# Patient Record
Sex: MALE | Race: WHITE | Employment: FULL TIME | URBAN - METROPOLITAN AREA
[De-identification: names, ages, dates, MRNs, and addresses within clinical notes are randomized per-mention and may not be internally consistent; named-entity substitution may affect disease eponyms.]

---

## 2020-10-12 NOTE — PROGRESS NOTES
PT aware of NPO status. PT aware of need to hold anticoagulants per protocol. PT aware of potential for sedation administration and need for  at discharge. PT aware of arrival time pre procedure. Arrive at 0900 on 10/13/20 for 1030 appt. Pt states no questions at this time.

## 2020-10-13 ENCOUNTER — HOSPITAL ENCOUNTER (OUTPATIENT)
Dept: INTERVENTIONAL RADIOLOGY/VASCULAR | Age: 40
Discharge: HOME | End: 2020-10-13
Attending: SURGERY | Admitting: SURGERY
Payer: COMMERCIAL

## 2020-10-13 VITALS
SYSTOLIC BLOOD PRESSURE: 115 MMHG | BODY MASS INDEX: 35.59 KG/M2 | TEMPERATURE: 98.4 F | OXYGEN SATURATION: 97 % | DIASTOLIC BLOOD PRESSURE: 72 MMHG | WEIGHT: 262.8 LBS | RESPIRATION RATE: 16 BRPM | HEART RATE: 80 BPM | HEIGHT: 72 IN

## 2020-10-13 DIAGNOSIS — G89.29 CHRONIC ABDOMINAL PAIN: ICD-10-CM

## 2020-10-13 DIAGNOSIS — R10.9 CHRONIC ABDOMINAL PAIN: ICD-10-CM

## 2020-10-13 LAB
APTT PPP: 27.9 SEC (ref 23–36.4)
INR PPP: 1.1 (ref 0.8–1.2)
PROTHROMBIN TIME: 14 SEC (ref 11.5–15.2)

## 2020-10-13 PROCEDURE — 77030016699 HC CATH ANGI DX INFN1 CARD -A

## 2020-10-13 PROCEDURE — 99152 MOD SED SAME PHYS/QHP 5/>YRS: CPT

## 2020-10-13 PROCEDURE — 36415 COLL VENOUS BLD VENIPUNCTURE: CPT

## 2020-10-13 PROCEDURE — 74011250637 HC RX REV CODE- 250/637: Performed by: SURGERY

## 2020-10-13 PROCEDURE — 74011000250 HC RX REV CODE- 250: Performed by: SURGERY

## 2020-10-13 PROCEDURE — 99153 MOD SED SAME PHYS/QHP EA: CPT

## 2020-10-13 PROCEDURE — 85610 PROTHROMBIN TIME: CPT

## 2020-10-13 PROCEDURE — 74011000636 HC RX REV CODE- 636: Performed by: SURGERY

## 2020-10-13 PROCEDURE — 85730 THROMBOPLASTIN TIME PARTIAL: CPT

## 2020-10-13 PROCEDURE — 75726 ARTERY X-RAYS ABDOMEN: CPT

## 2020-10-13 PROCEDURE — 74011250636 HC RX REV CODE- 250/636: Performed by: SURGERY

## 2020-10-13 RX ORDER — VERAPAMIL HYDROCHLORIDE 2.5 MG/ML
INJECTION, SOLUTION INTRAVENOUS
Status: DISPENSED
Start: 2020-10-13 | End: 2020-10-13

## 2020-10-13 RX ORDER — FLUMAZENIL 0.1 MG/ML
0.2 INJECTION INTRAVENOUS
Status: DISCONTINUED | OUTPATIENT
Start: 2020-10-13 | End: 2020-10-13 | Stop reason: HOSPADM

## 2020-10-13 RX ORDER — HEPARIN SODIUM 200 [USP'U]/100ML
1000 INJECTION, SOLUTION INTRAVENOUS
Status: DISCONTINUED | OUTPATIENT
Start: 2020-10-13 | End: 2020-10-13 | Stop reason: HOSPADM

## 2020-10-13 RX ORDER — MIDAZOLAM HYDROCHLORIDE 1 MG/ML
.5-4 INJECTION, SOLUTION INTRAMUSCULAR; INTRAVENOUS
Status: DISCONTINUED | OUTPATIENT
Start: 2020-10-13 | End: 2020-10-13 | Stop reason: HOSPADM

## 2020-10-13 RX ORDER — VERAPAMIL HYDROCHLORIDE 2.5 MG/ML
2.5 INJECTION, SOLUTION INTRAVENOUS ONCE
Status: COMPLETED | OUTPATIENT
Start: 2020-10-13 | End: 2020-10-13

## 2020-10-13 RX ORDER — LIDOCAINE HYDROCHLORIDE 10 MG/ML
1-10 INJECTION, SOLUTION EPIDURAL; INFILTRATION; INTRACAUDAL; PERINEURAL
Status: DISCONTINUED | OUTPATIENT
Start: 2020-10-13 | End: 2020-10-13 | Stop reason: HOSPADM

## 2020-10-13 RX ORDER — NITROGLYCERIN 10 MG/100ML
5-200 INJECTION INTRAVENOUS
Status: DISCONTINUED | OUTPATIENT
Start: 2020-10-13 | End: 2020-10-13 | Stop reason: HOSPADM

## 2020-10-13 RX ORDER — ONDANSETRON 2 MG/ML
4 INJECTION INTRAMUSCULAR; INTRAVENOUS
Status: DISCONTINUED | OUTPATIENT
Start: 2020-10-13 | End: 2020-10-13 | Stop reason: HOSPADM

## 2020-10-13 RX ORDER — NALOXONE HYDROCHLORIDE 0.4 MG/ML
0.4 INJECTION, SOLUTION INTRAMUSCULAR; INTRAVENOUS; SUBCUTANEOUS AS NEEDED
Status: DISCONTINUED | OUTPATIENT
Start: 2020-10-13 | End: 2020-10-13 | Stop reason: HOSPADM

## 2020-10-13 RX ORDER — SODIUM CHLORIDE 9 MG/ML
25 INJECTION, SOLUTION INTRAVENOUS CONTINUOUS
Status: DISCONTINUED | OUTPATIENT
Start: 2020-10-13 | End: 2020-10-13 | Stop reason: HOSPADM

## 2020-10-13 RX ORDER — NITROGLYCERIN 10 MG/100ML
INJECTION INTRAVENOUS
Status: DISPENSED
Start: 2020-10-13 | End: 2020-10-13

## 2020-10-13 RX ORDER — OXYCODONE AND ACETAMINOPHEN 5; 325 MG/1; MG/1
1-2 TABLET ORAL
Status: DISCONTINUED | OUTPATIENT
Start: 2020-10-13 | End: 2020-10-13 | Stop reason: HOSPADM

## 2020-10-13 RX ORDER — FENTANYL CITRATE 50 UG/ML
25-200 INJECTION, SOLUTION INTRAMUSCULAR; INTRAVENOUS
Status: DISCONTINUED | OUTPATIENT
Start: 2020-10-13 | End: 2020-10-13 | Stop reason: HOSPADM

## 2020-10-13 RX ORDER — HEPARIN SODIUM 1000 [USP'U]/ML
10000 INJECTION, SOLUTION INTRAVENOUS; SUBCUTANEOUS
Status: DISCONTINUED | OUTPATIENT
Start: 2020-10-13 | End: 2020-10-13 | Stop reason: HOSPADM

## 2020-10-13 RX ADMIN — FENTANYL CITRATE 50 MCG: 50 INJECTION, SOLUTION INTRAMUSCULAR; INTRAVENOUS at 11:22

## 2020-10-13 RX ADMIN — HEPARIN SODIUM IN SODIUM CHLORIDE 2000 UNITS: 200 INJECTION INTRAVENOUS at 12:08

## 2020-10-13 RX ADMIN — HEPARIN SODIUM 7000 UNITS: 1000 INJECTION INTRAVENOUS; SUBCUTANEOUS at 11:21

## 2020-10-13 RX ADMIN — LIDOCAINE HYDROCHLORIDE ANHYDROUS 5 ML: 10 INJECTION, SOLUTION INFILTRATION at 12:07

## 2020-10-13 RX ADMIN — MIDAZOLAM HYDROCHLORIDE 1 MG: 1 INJECTION, SOLUTION INTRAMUSCULAR; INTRAVENOUS at 11:25

## 2020-10-13 RX ADMIN — VERAPAMIL HYDROCHLORIDE 2.5 MG: 2.5 INJECTION INTRAVENOUS at 11:22

## 2020-10-13 RX ADMIN — FENTANYL CITRATE 50 MCG: 50 INJECTION, SOLUTION INTRAMUSCULAR; INTRAVENOUS at 11:25

## 2020-10-13 RX ADMIN — OXYCODONE HYDROCHLORIDE AND ACETAMINOPHEN 2 TABLET: 5; 325 TABLET ORAL at 15:09

## 2020-10-13 RX ADMIN — SODIUM CHLORIDE 25 ML/HR: 900 INJECTION, SOLUTION INTRAVENOUS at 10:15

## 2020-10-13 RX ADMIN — SODIUM CHLORIDE 125 ML/HR: 900 INJECTION, SOLUTION INTRAVENOUS at 11:39

## 2020-10-13 RX ADMIN — IOPAMIDOL 55 ML: 510 INJECTION, SOLUTION INTRAVASCULAR at 12:07

## 2020-10-13 RX ADMIN — MIDAZOLAM HYDROCHLORIDE 1 MG: 1 INJECTION, SOLUTION INTRAMUSCULAR; INTRAVENOUS at 11:22

## 2020-10-13 RX ADMIN — NITROGLYCERIN: 10 INJECTION INTRAVENOUS at 11:22

## 2020-10-13 NOTE — PROCEDURES
[facility]  PROCEDURE NOTE        Date: 10- 1210 PM    Pre-Op Dx : MESENTERIC ISCHEMIA    Post-Op Dx: NO MESENTERIC OCCLUSIVE DISEASE    Procedure : Diagnostic mesenteric angiogram, ultrasound-guided sheath insertion left radial artery, measurement of pressure gradient across the superior mesenteric artery    Surgeon : Pierre Norris DO, FACS    Assistant: NONE    Anesthesia : Moderate sedation with 1% lidocaine injected locally at sheath insertion site    Findings :   Widely patent celiac artery and superior mesenteric artery. No apparent vessel is identified. Pressure across the superior mesenteric artery is equal on both sides with a systolic pressure of 428 mmHg and a diastolic between 70 to 80 mmHg and a mean arterial pressure of around 100-105. Comp: None    EBL : Minimal    Contrast : 55 ml. Visipaque    Access : LEFT RADIAL ARTERY    Implants : NONE    Specimens: None    Blood Product Administered : NONE    Sheath Size :5/6 SLENDER TERUMO    Heparin : 7 thousand units    Closure Device : TR BAND APPLICATION    US Guidance : YES    Special Devices : NONE    Sedation :    Moderate sedation was administered. The patient was constantly monitored by Bridger Jeffries from 1122 until 1158  hours. Fentanyl 100 mcg, Versed 2 mg. INDICATION FOR PROCEDURE:    64-year-old white male who presented to my office with significant abdominal pain and postprandial abdominal pain as well as weight loss. His CTA was not revealing of any major occlusive lesions in his mesenteric vessels. The duplex ultrasound suggested there was an anomalous vessel or some possible occlusion in the superior mesenteric artery for this reason he is brought for mesenteric angiography with possible intervention.   The risks, benefits and potential complications of the procedure were explained to the patient prior to proceeding and he signed a consent allowing me to proceed      TECHNICAL DETAILS OF PROCEDURE:    The patient was identified and brought to the interventional suite and placed in supine position. The left wrist was prepped and draped in a sterile fashion. An Андрей's test had been performed prior. A timeout was taken. Safety precautions were met. With ultrasound guidance I accessed the left radial artery after numbing the skin with 1% lidocaine. Using a micropuncture access and Seldinger technique I upsized to my 5/6 slender sheath. I systemically heparinized the patient with 7000 into the IV heparin. Through the sheath I injected 2.5 mg of verapamil and 4200 mcg of nitroglycerin. I then passed a Glidewire advantage 0.35 wire and a pigtail catheter through the brachial artery into the axillary artery subclavian artery and then into the descending thoracic aorta. This advanced rather easily together and I advanced them to the T12 level. I removed the wire and then did a power injection from a lateral projection of the mesenteric vessels which did not appear to have any stenotic lesion they were widely open with no ostial lesion either. I wanted to measure pressure gradient across the superior mesenteric artery so I selected out with a stiff angle Glidewire and angled trailblazer catheter and measured pressures across the ostium with the findings as described above. There was no pressure difference across the ostium of the superior mesenteric artery. At this point I removed everything and pulled my sheath from the left wrist and applied a TR band successfully. Hemostasis was good. The patient tolerated the procedure well and went to the recovery in stable condition.   I was present and scrubbed throughout      [address] [place] [address] [facility]  [phone]  Pager [phone]

## 2020-10-13 NOTE — PROGRESS NOTES
TRANSFER - OUT REPORT:    Verbal report given to Piedad Edouard RN(name) on Nyoka Goltz  being transferred to Care Unit(unit) for routine progression of care       Report consisted of patients Situation, Background, Assessment and   Recommendations(SBAR). Information from the following report(s) SBAR, Kardex and MAR was reviewed with the receiving nurse. Lines:   Peripheral IV 10/13/20 Posterior;Right Hand (Active)        Opportunity for questions and clarification was provided.       Patient transported with:   Registered Nurse

## 2020-10-13 NOTE — PROGRESS NOTES
Back from procedure. TR band intact to left wrist. No bleeding or swelling. Denies pain. 1300 Dr Allen Barthel in.   1442 TR band released. Sterile 2x2 & tegaderm applied with armboard. 1545 Discharge instructions reviewed. Ambulated to bathroom, voided. 1605 Discharged home via w/c in stable condition in care of friend. Pain decreased 5/10 improved in left arm. Dressing intact to left radial no bleeding or swelling.

## 2020-10-13 NOTE — DISCHARGE INSTRUCTIONS
DISCHARGE SUMMARY from Nurse    PATIENT INSTRUCTIONS:    After general anesthesia or intravenous sedation, for 24 hours or while taking prescription Narcotics:  · Limit your activities  · Do not drive and operate hazardous machinery  · Do not make important personal or business decisions  · Do  not drink alcoholic beverages  · If you have not urinated within 8 hours after discharge, please contact your surgeon on call. Report the following to your surgeon:  · Excessive pain, swelling, redness or odor of or around the surgical area  · Temperature over 100.5  · Nausea and vomiting lasting longer than 4 hours or if unable to take medications  · Any signs of decreased circulation or nerve impairment to extremity: change in color, persistent  numbness, tingling, coldness or increase pain  · Any questions    What to do at Home:  Recommended activity:     If you experience any of the following symptoms , please follow up with . *  Please give a list of your current medications to your Primary Care Provider. *  Please update this list whenever your medications are discontinued, doses are      changed, or new medications (including over-the-counter products) are added. *  Please carry medication information at all times in case of emergency situations. These are general instructions for a healthy lifestyle:    No smoking/ No tobacco products/ Avoid exposure to second hand smoke  Surgeon General's Warning:  Quitting smoking now greatly reduces serious risk to your health.     Obesity, smoking, and sedentary lifestyle greatly increases your risk for illness    A healthy diet, regular physical exercise & weight monitoring are important for maintaining a healthy lifestyle    You may be retaining fluid if you have a history of heart failure or if you experience any of the following symptoms:  Weight gain of 3 pounds or more overnight or 5 pounds in a week, increased swelling in our hands or feet or shortness of breath while lying flat in bed. Please call your doctor as soon as you notice any of these symptoms; do not wait until your next office visit. The discharge information has been reviewed with the patient and spouse. The patient and spouse verbalized understanding. Discharge medications reviewed with the patient and spouse and appropriate educational materials and side effects teaching were provided. ___________________________________________________________________________________________________________________________________                  Erica Maxwell Discharge Instructions    *Check the puncture site frequently for swelling or bleeding. If you see any bleeding, lie down and apply pressure over the area with a clean towel or washcloth. Notify your doctor for any redness, swelling, drainage or oozing from the puncture site. Notify your doctor for any fever or chills. *If the leg or arm with the puncture becomes cold, numb or painful, call Dr Irais Bradshaw      *Activity should be limited for the next 48 hours. Climb stairs as little as possible and avoid any stooping, bending or strenuous activity for 48 hours. No heavy lifting (anything over 10 pounds) for three days. *Do not drive for 24     hours. *You may resume your usual diet. Drink more fluids than usual.    *Have a responsible person drive you home and stay with you for at least 24 hours after your heart catheterization/angiography. *You may remove the bandage from your Left and Arm in 24 hours. You may shower in 24 hours. No tub baths, hot tubs or swimming for one week. Do not place any lotions, creams, powders, ointments over the puncture site for one week. You may place a clean band-aid over the puncture site each day for 5 days. Change this daily.   Patient armband removed and shredded

## 2020-10-13 NOTE — H&P
ASSESSMENT:  As detailed in the progress note below, the patient was assessed for:  (R10.9,  G89.29) Chronic abdominal pain     (K55.1) Mesenteric ischemia due to arterial insufficiency (HCC)     PLAN:  Discussed evaluation, treatment and usual course. Patient and Spouse verbalizes understanding and agrees with plan of care. All questions were answered. Scheduled for Mesenteric angiogram with possible intervention. He may have a superior mesenteric artery occlusive process that will not be seen unless I do a direct injection of contrast.  Once we rule this out as a possibility then other causes might be entertained     SUBJECTIVE:       Chief Complaint   Patient presents with    ABDOMINAL PAIN       new pt w pvl studies         HPI     68-year-old white male comes in today with debilitating abdominal pain that has been occurring since June. He says the pain is epigastric and constant and never goes away. He says he has gained weight since then because he does not work out anymore. He used to weigh 210 and is now 250 pounds. He said his appetite as well as decreased though. He can eat maybe at 1 meal a day at most.  He says he feels like the food just \"goes through me\". He feels abdominal pain when he eats. He is afraid to eat because it hurts. He says he has had blood clots in his stool but it is not a regular phenomenon. He has an upper and lower endoscopy which did not reveal much according to him. He often times has loose stools and nothing solid. He says he drinks a lot but he also has low urination. As of the abdominal pain is gripping. I reviewed his CT scans and did not look like he had any narrowing in the superior mesenteric artery, celiac artery or inferior mesenteric artery. The PVL tech who did his ultrasound felt that there was some \"anomalous vessel near the superior mesenteric artery that was occluded and then reconstituted.   She could not tell if it was a branch of the superior mesenteric artery or completely separate aberrant vessel. I cannot appreciate this on the CTA. The arterial phase of contrast is poor. I had a long discussion with him. I think that at least doing a diagnostic angiogram to rule out a real mesenteric arterial insufficiency would be worthwhile given his age and the chronicity and severity of his symptoms. He and his wife were agreed to proceed with at least diagnostic and possible therapeutic angiography  History reviewed. No pertinent past medical history. Past Surgical History:   Procedure Laterality Date    ARTHROSCOPY KNEE        EXCISION OF LIPOMA          History reviewed. No pertinent family history. Social History               Socioeconomic History    Marital status:        Spouse name: Not on file    Number of children: Not on file    Years of education: Not on file    Highest education level: Not on file   Occupational History    Not on file   Social Needs    Financial resource strain: Not on file    Food insecurity       Worry: Not on file       Inability: Not on file    Transportation needs       Medical: Not on file       Non-medical: Not on file   Tobacco Use    Smoking status: Never Smoker    Smokeless tobacco: Former User   Substance and Sexual Activity    Alcohol use:  Yes    Drug use: Not on file    Sexual activity: Not on file   Lifestyle    Physical activity       Days per week: Not on file       Minutes per session: Not on file    Stress: Not on file   Relationships    Social connections       Talks on phone: Not on file       Gets together: Not on file       Attends Yazidi service: Not on file       Active member of club or organization: Not on file       Attends meetings of clubs or organizations: Not on file       Relationship status: Not on file    Intimate partner violence       Fear of current or ex partner: Not on file       Emotionally abused: Not on file       Physically abused: Not on file       Forced sexual activity: Not on file   Other Topics Concern    Back Care Not Asked    Bike Helmet Not Asked    Blood Transfusions Not Asked    Caffeine Concern Not Asked    Counseling Not Asked    Depression Concerns Not Asked    Depression Screening Not Asked    Exercise Not Asked   Earna Shawl Hazards Not Asked   [address] Service Not Asked    Occupational Exposure Not Asked   [address] Not Asked    Self-Exams Not Asked    Sleep Concern Not Asked    Smoke Detectors Not Asked    Smoking Concerns Not Asked    Smoking Cessation Not Asked    Special Diet Not Asked    Stress Concern Not Asked    Weight Concern Not Asked   Social History Narrative    Not on file        E-Cigarette Use:    Start Date:    Quit Date:    Cartridges/Day:                Current Outpatient Medications   Medication Sig Dispense Refill    escitalopram oxalate (LEXAPRO) 20 mg PO TABS Take 20 mg by Mouth.        Fexofenadine-Pseudoephedrine 180-240 mg PO TB24 Take 1 Tab by Mouth Once a Day.        LORazepam (ATIVAN) 0.5 mg PO TABS take 1 tablet by mouth three times a day if needed for anxiety        omalizumab (XOLAIR) 150 mg SC SOLR Inject 150 mg beneath the skin Every 14 Days.        ondansetron (ZOFRAN) 4 mg PO TbDL Take 1 Tab by Mouth Every 8 Hours As Needed for Nausea (PRN FOR NAUSEA AND VOMITING). ALLOW TABLET TO DISSOLVE ON TONGUE. 12 Tab 0    oxyCODONE-acetaminophen (PERCOCET) 5-325 mg PO TABS Take 1 Tab by Mouth Every 4 Hours As Needed. Do not exceed 4000 mg of acetaminophen per day.  18 Tab 0      No current facility-administered medications for this visit.            Allergies   Allergen Reactions    Aloe hives    Egg other/intolerance    Glycerin other/intolerance    Peanuts other/intolerance    Seasonale [Levonorgestrel-Ethinyl Estrad] unknown    Corn Starch-Kaolin-Zinc Oxide mild rash/itching    Soy other/intolerance    Wheat unknown         ROS  Review of Systems  General: negative for fever   Eyes: negative for vision loss   HENT: negative for cold symptoms   Respiratory negative for shortness of breath   Cardiac: negative for chest pain   Vascular + ab pain/artery blockage   Gastrointestinal: + for abdominal pain   Genitourinary: negative for dysuria    Endocrine: negative for excessive thirst   Skin: negative for rash   Neurological: negative for paralysis   Psychiatric: negative for depression                      OBJECTIVE:  /90   Ht 6' (1.829 m)   Wt 115.7 kg (255 lb)   BMI 34.58 kg/m²  BMI: 34.58     Physical Exam   Constitutional: He is oriented to person, place, and time and well-developed, well-nourished, and in no distress. HENT:   Head: Normocephalic and atraumatic. Eyes: Right eye visual fields normal and left eye visual fields normal. Pupils are equal, round, and reactive to light. EOM are normal.   Neck: Normal range of motion. Neck supple. Normal carotid pulses and no JVD present. Carotid bruit is not present. No thyromegaly present. Cardiovascular: Normal rate, regular rhythm, normal heart sounds and intact distal pulses. PMI is not displaced. Exam reveals no decreased pulses. Pulses:       Carotid pulses are 2+ on the right side and 2+ on the left side. Radial pulses are 2+ on the right side and 2+ on the left side. Femoral pulses are 2+ on the right side and 2+ on the left side. Popliteal pulses are 2+ on the right side and 2+ on the left side. Dorsalis pedis pulses are 2+ on the right side and 2+ on the left side. Posterior tibial pulses are 2+ on the right side and 2+ on the left side. Pulmonary/Chest: Effort normal and breath sounds normal. He has no wheezes. He has no rales. Abdominal: Soft. Normal aorta and bowel sounds are normal. He exhibits no distension, no abdominal bruit, no pulsatile midline mass and no mass. There is no hepatosplenomegaly. There is abdominal tenderness in the epigastric area. There is no guarding. Musculoskeletal: Normal range of motion. General: No tenderness or edema. Lymphadenopathy:     He has no cervical adenopathy. Neurological: He is alert and oriented to person, place, and time. He has normal strength. He displays no weakness, facial symmetry and normal speech. No cranial nerve deficit. Gait normal. Gait normal.   Skin: Skin is warm and dry. No bruising, no ecchymosis, no lesion and no rash noted. No cyanosis or erythema. No pallor.    Psychiatric: Mood, affect and judgment normal.